# Patient Record
Sex: MALE | Race: WHITE
[De-identification: names, ages, dates, MRNs, and addresses within clinical notes are randomized per-mention and may not be internally consistent; named-entity substitution may affect disease eponyms.]

---

## 2019-02-08 ENCOUNTER — HOSPITAL ENCOUNTER (EMERGENCY)
Dept: HOSPITAL 88 - ER | Age: 74
Discharge: HOME | End: 2019-02-08
Payer: MEDICARE

## 2019-02-08 VITALS — BODY MASS INDEX: 23.54 KG/M2 | WEIGHT: 150 LBS | HEIGHT: 67 IN

## 2019-02-08 VITALS — SYSTOLIC BLOOD PRESSURE: 149 MMHG | DIASTOLIC BLOOD PRESSURE: 88 MMHG

## 2019-02-08 DIAGNOSIS — K29.00: ICD-10-CM

## 2019-02-08 DIAGNOSIS — E78.5: ICD-10-CM

## 2019-02-08 DIAGNOSIS — R10.13: Primary | ICD-10-CM

## 2019-02-08 PROCEDURE — 99282 EMERGENCY DEPT VISIT SF MDM: CPT

## 2019-02-08 NOTE — XMS REPORT
Clinical Summary

                             Created on: 2019



Chester Merrill

External Reference #: [id number]

: 1945

Sex: Male



Demographics







                          Address                   [address] [place] [address] [place]  [address]

 

                          Home Phone                [phone]

 

                          Preferred Language        English

 

                          Marital Status            

 

                          Jain Affiliation     Unknown

 

                          Race                      White

 

                          Ethnic Group              Non-





Author







                          Author                    [facility]

 

                          Organization              [facility]

 

                          Address                   Unknown

 

                          Phone                     Unavailable







Support







                Name            Relationship    Address         Phone

 

                Lulu Merrill    [employer]            Unknown         [phone]







Care Team Providers







                    Care Team Member Name    Role                Phone

 

                    FELICIA Baron DO    PCP                 [phone]







Allergies

No Known Allergies



Medications

No known medications



Active Problems







 



                           Problem                   Noted Date

 

 



                           Male hypogonadism         2018

 

 



                           Sexual dysfunction        2018

 

 



                           History of prostate cancer     2018







Encounters







                          Care Team                 Description



                     Date                Type                Specialty  

 

                                        



FELICIA Baron DO                 Male hypogonadism (Primary Dx); 

Sexual dysfunction; 

History of prostate cancer



                     2018          Office Visit        Family Medicine  



after 2018



Family History







   



                 Medical History     Relation        Name            Comments

 

   



                           Diabetes                  Brother  

 

   



                           Prostate cancer           Brother  

 

   



                           Alcohol abuse             Father  

 

   



                           Prostate cancer           Father  

 

   



                           Dementia                  Mother  

 

   



                           Pneumonia                 Mother  

 

   



                           Breast cancer             Sister  

 

   



                           Cancer                    Sister  









   



                 Relation        Name            Status          Comments

 

   



                           Brother                   Alive 

 

   



                           Father                     



                                         (Age 81) 

 

   



                           Mother                     



                                         (Age 92) 

 

   



                           Sister                    Alive 







Social History







                                        Date



                 Tobacco Use     Types           Packs/Day       Years Used 

 

                                         



                                         Never Smoker    

 

    



                                         Smokeless Tobacco: Never   



                                         Used   









   



                 Alcohol Use     Drinks/Week     oz/Week         Comments

 

   



                                         No   









 



                           Sex Assigned at Birth     Date Recorded

 

 



                                         Not on file 









                                        Industry



                           Job Start Date            Occupation 

 

                                        Not on file



                           Not on file               Not on file 









                                        Travel End



                           Travel History            Travel Start 

 





                                         No recent travel history available.







Last Filed Vital Signs







                                        Time Taken



                           Vital Sign                Reading 

 

                                        2018  8:26 AM CDT



                           Blood Pressure            149/80 

 

                                        2018  8:26 AM CDT



                           Pulse                     58 

 

                                        2018  8:26 AM CDT



                           Temperature               36.7 C (98 F) 

 

                                        -



                           Respiratory Rate          - 

 

                                        2018  8:26 AM CDT



                           Oxygen Saturation         99% 

 

                                        -



                           Inhaled Oxygen            - 



                                         Concentration  

 

                                        2018  8:26 AM CDT



                           Weight                    70.3 kg (155 lb) 

 

                                        2018  8:26 AM CDT



                           Height                    168.9 cm (5' 6.5") 

 

                                        2018  8:26 AM CDT



                           Body Mass Index           24.64 







Plan of Treatment







   



                 Health Maintenance     Due Date        Last Done       Comments

 

   



                           COLON CANCER SCREENING     1994  

 

   



                           SHINGLES VACCINES (1 of     1994  



                                         2)   

 

   



                           PNEUMOCOCCAL              2009  



                                         POLYSACCHARIDE VACCINE   



                                         AGE 65 AND OVER   

 

   



                           PNEUMOCOCCAL-13           2009  

 

   



                           INFLUENZA VACCINE         2018  







Results

Not on fileafter 2018



Insurance







     



            Payer      Benefit     Subscriber ID     Type       Phone      Address



                                         Plan /    



                                         Group    

 

     



                 [employer]       [employer]       xxxxxxxxx       O  



                                         [facility]    









     



            Guarantor Name     Account     Relation to     Date of     Phone      Billing Address



                     Type                Patient             Birth  

 

     



            Chester Merrill     Personal/F     Self       1945     [phone] [address] [facility] 

Dr elkins               ([place])              [place] [address]







Advance Directives





Patient has advance care planning documents on file. For more information, celsa rivera contact:



Feroz Kent



[address] [place] [address]